# Patient Record
Sex: FEMALE | Race: WHITE | NOT HISPANIC OR LATINO | Employment: STUDENT | URBAN - NONMETROPOLITAN AREA
[De-identification: names, ages, dates, MRNs, and addresses within clinical notes are randomized per-mention and may not be internally consistent; named-entity substitution may affect disease eponyms.]

---
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
## 2023-05-30 DIAGNOSIS — N92.6 MENSTRUAL IRREGULARITY: ICD-10-CM

## 2023-05-30 DIAGNOSIS — Z30.09 ENCOUNTER FOR OTHER GENERAL COUNSELING OR ADVICE ON CONTRACEPTION: ICD-10-CM

## 2023-05-30 RX ORDER — MEDROXYPROGESTERONE ACETATE 150 MG/ML
150 INJECTION, SUSPENSION INTRAMUSCULAR
Qty: 1 ML | Refills: 4 | Status: SHIPPED | OUTPATIENT
Start: 2023-05-30

## 2023-05-30 NOTE — TELEPHONE ENCOUNTER
Rx Refill Note  Requested Prescriptions     Pending Prescriptions Disp Refills    medroxyPROGESTERone (DEPO-PROVERA) 150 MG/ML injection [Pharmacy Med Name: MEDROXYPROGESTERONE ACETATE 150 TUCKER] 1 mL 4     Sig: Inject 1 mL into the appropriate muscle as directed by prescriber Every 3 (Three) Months.      Last office visit with prescribing clinician: 6/6/2022   Last telemedicine visit with prescribing clinician: Visit date not found   Next office visit with prescribing clinician: 6/9/2023                         Would you like a call back once the refill request has been completed: [] Yes [] No    If the office needs to give you a call back, can they leave a voicemail: [] Yes [] No    Yaneth Aranda LPN  05/30/23, 11:01 CDT

## 2023-05-31 NOTE — TELEPHONE ENCOUNTER
Pt has not been seen since last June.  Will have to have an office visit before any more is sent in.  I sent 1 month to get her in    31-May-2023 16:01

## 2023-06-07 ENCOUNTER — OFFICE VISIT (OUTPATIENT)
Dept: FAMILY MEDICINE CLINIC | Facility: CLINIC | Age: 18
End: 2023-06-07
Payer: COMMERCIAL

## 2023-06-07 VITALS
DIASTOLIC BLOOD PRESSURE: 89 MMHG | BODY MASS INDEX: 21.03 KG/M2 | TEMPERATURE: 98.6 F | RESPIRATION RATE: 18 BRPM | OXYGEN SATURATION: 99 % | SYSTOLIC BLOOD PRESSURE: 146 MMHG | HEART RATE: 82 BPM | WEIGHT: 142 LBS | HEIGHT: 69 IN

## 2023-06-07 DIAGNOSIS — Z30.011 OCP (ORAL CONTRACEPTIVE PILLS) INITIATION: Primary | ICD-10-CM

## 2023-06-07 PROCEDURE — 99214 OFFICE O/P EST MOD 30 MIN: CPT | Performed by: NURSE PRACTITIONER

## 2023-06-07 RX ORDER — SPIRONOLACTONE 50 MG/1
50 TABLET, FILM COATED ORAL 2 TIMES DAILY
COMMUNITY
Start: 2023-03-08

## 2023-06-07 RX ORDER — DROSPIRENONE AND ETHINYL ESTRADIOL 0.02-3(28)
1 KIT ORAL DAILY
Qty: 28 TABLET | Refills: 11 | Status: SHIPPED | OUTPATIENT
Start: 2023-06-07

## 2023-06-07 NOTE — PROGRESS NOTES
"Chief Complaint  Contraception (Pt wanted to switch BC due to side effects of depo)    Subjective        Jared Bishop presents to [facility]  History of Present Illness  Jared Bishop is a 17-year-old female presenting to discuss oral contraceptives.     The patient reports she did not have menstrual cycles when she was on Depo Provera injections. She is sexually active and has not had a Pap smear. She is due for a physical but not eligible to have it done until after 07/13/2023. She was supposed to get her next Depo Provera injection yesterday, 06/06/2023 but did not and is interested in starting oral contraceptives.     She reports constipation that has been causing her to take MiraLAX daily.     She is in college at [employer] ([employer]) and will start her second semester in the Fall of 2023.     Objective   Vital Signs:  /89 (BP Location: Left arm, Patient Position: Sitting, Cuff Size: Adult)   Pulse 82   Temp 98.6 °F (37 °C) (Infrared)   Resp 18   Ht 175.3 cm (69\")   Wt 64.4 kg (142 lb)   SpO2 99%   BMI 20.97 kg/m²   Estimated body mass index is 20.97 kg/m² as calculated from the following:    Height as of this encounter: 175.3 cm (69\").    Weight as of this encounter: 64.4 kg (142 lb).  41 %ile (Z= -0.22) based on CDC (Girls, 2-20 Years) BMI-for-age based on BMI available as of 6/7/2023.    BMI is within normal parameters. No other follow-up for BMI required.    Physical Exam  Vitals and nursing note reviewed.   Constitutional:       General: She is awake.      Appearance: Normal appearance. She is well-developed and well-groomed.   HENT:      Head: Normocephalic and atraumatic.      Right Ear: Hearing, tympanic membrane, ear canal and external ear normal.      Left Ear: Hearing, tympanic membrane, ear canal and external ear normal.      Nose: Nose normal.      Mouth/Throat:      Lips: Pink.      Pharynx: Oropharynx is clear. "   Eyes:      General: Lids are normal.      Conjunctiva/sclera: Conjunctivae normal.   Cardiovascular:      Rate and Rhythm: Normal rate and regular rhythm.      Heart sounds: Normal heart sounds.   Pulmonary:      Effort: Pulmonary effort is normal.      Breath sounds: Normal breath sounds and air entry.   Musculoskeletal:      Cervical back: Full passive range of motion without pain.      Right lower leg: No edema.      Left lower leg: No edema.   Lymphadenopathy:      Head:      Right side of head: No submental, submandibular or tonsillar adenopathy.      Left side of head: No submental, submandibular or tonsillar adenopathy.   Skin:     General: Skin is warm and dry.   Neurological:      Mental Status: She is alert.      Sensory: Sensation is intact.      Motor: Motor function is intact.      Coordination: Coordination is intact.      Gait: Gait is intact.   Psychiatric:         Attention and Perception: Attention and perception normal.         Mood and Affect: Mood and affect normal.         Speech: Speech normal.         Behavior: Behavior normal. Behavior is cooperative.         Thought Content: Thought content normal.         Cognition and Memory: Cognition and memory normal.         Judgment: Judgment normal.      Result Review :                   Assessment and Plan   Diagnoses and all orders for this visit:    1. OCP (oral contraceptive pills) initiation (Primary)  -     drospirenone-ethinyl estradiol (JAIME) 3-0.02 MG per tablet; Take 1 tablet by mouth Daily.  Dispense: 28 tablet; Refill: 11    Urine poc pregnancy ordered today     We will start her on the Jaime oral contraceptive. Recommended that she set a reminder on her phone to assist with remembering to take it. If she skips a tablet one night, she should take it as soon as she remembers or the next day. I explained that there is a possibility of breakthrough bleeding with skipped doses. Advised that she may have a menstrual period since she is  switching from one birth control to another. It is fine for her to go ahead and start taking the Stephie today, or she can pick a day that is convenient for her.       POC Pregnancy, Urine [POC7] (Order [id number])  Order  Date: 6/6/2022 Department: [facility] Ordering/Authorizing: Mecca Overton, DNP, APRN     Reprint Order Requisition    POC Pregnancy, Urine (Order #[id number]) on 6/6/22        POC Pregnancy, Urine  Order: [id number]  Status: Final result       Visible to patient: Yes (seen)       Next appt: None       Dx: Menstrual irregularity; Encounter for...    Specimen Information: Urine   0 Result Notes           Component  Ref Range & Units 1 yr ago  (6/6/22) 2 yr ago  (5/3/21) 4 yr ago  (2/22/19) 4 yr ago  (2/22/19) 4 yr ago  (8/28/18) 5 yr ago  (2/6/18)   HCG, Urine, QL  Negative Negative Negative   Negative    Lot Number [id number]   Internal Positive Control  Positive, Passed Positive Positive R   Positive R    Internal Negative Control  Negative, Passed Negative Negative R   Negative R    Expiration Date 07/31/2023  9,212,021 5,312,020  12/31/19   Resulting Agency [facility]              Specimen Collected: 06/06/22 12:27 CDT Last Resulted: 06/06/22 12:27 CDT             Health Maintenance Summary      Overdue - ANNUAL PHYSICAL: (Yearly): Overdue since 7/13/2022 07/13/2021  Registry Metric: Last Annual Physical      Postponed - HPV VACCINES: (1 - 2-dose series): Postponed until 6/7/2023 06/07/2023  Postponed until 6/7/2023 by Milana Parmar MA (Patient Refused)    06/06/2022  Postponed until 6/6/2022 by Yaneth Aranda LPN (Pending event)      Postponed - COVID-19 Vaccine: (3 - Pfizer series): Postponed until  6/9/2023 06/07/2023  Postponed until 6/9/2023 by Milana Parmar MA (Patient Refused)    06/06/2022  Postponed until 6/25/2022 by Yaneth Aranda LPN (Pending event)    05/16/2021  Imm Admin: COVID-19 (PFIZER) PURPLE CAP    04/12/2021  Imm Admin: COVID-19 (PFIZER) PURPLE CAP      Postponed - HEPATITIS C SCREENING: (Once): Postponed until 7/13/2023 06/07/2023  Postponed until 7/13/2023 by Mecca Overton DNP, APRN (Pending event)    06/06/2022  Postponed until 6/6/2022 by Yaneth Aranda LPN (Pending event)      Postponed - CHLAMYDIA SCREENING: (Yearly): Postponed until 11/27/2023 06/07/2023  Postponed until 11/27/2023 by Mecca Overton DNP, MADINA (Pending event)    06/06/2022  Postponed until 6/6/2022 by Yaneth Aranda LPN (Pending event)      INFLUENZA VACCINE: (Yearly - August to March): Next due on 8/1/2023 11/04/2022  Imm Admin: FluLaval/Fluzone >6mos    02/06/2018  Declined      TDAP/TD VACCINES: (2 - Td or Tdap): Next due on 5/20/2025 05/20/2015  Imm Admin: Tdap      MENINGOCOCCAL VACCINE: (Series Information): Completed  01/27/2021  Imm Admin: Meningococcal Conjugate    05/20/2015  Imm Admin: Meningococcal Conjugate    05/20/2015  Imm Admin: Meningococcal MCV4P (Menactra)    05/20/2015  Imm Admin: MCV4 Unspecified      Pneumococcal Vaccine 0-64: (Series Information): Aged Out  No completion, postpone, frequency change, or communication history exists for this topic.          Follow Up   Return for Annual physical  due in july .  Patient was given instructions and counseling regarding her condition or for health maintenance advice. Please see specific information pulled into the AVS if appropriate.       Answers submitted by the patient for this visit:  Other (Submitted on 6/2/2023)  Please describe your symptoms.: N/A  Have you had these symptoms before?: No  How long have you been having these symptoms?: Greater than 2 weeks  Primary Reason for Visit (Submitted on 6/2/2023)  What is the primary  reason for your visit?: Other    Transcribed from ambient dictation for Mecca Overton, JERAD, APRN by Talita Sullivan.  06/07/23   15:44 CDT    Patient or patient representative verbalized consent to the visit recording.  I have personally performed the services described in this document as transcribed by the above individual, and it is both accurate and complete.

## 2023-08-11 ENCOUNTER — OFFICE VISIT (OUTPATIENT)
Dept: FAMILY MEDICINE CLINIC | Facility: CLINIC | Age: 18
End: 2023-08-11
Payer: COMMERCIAL

## 2023-08-11 VITALS
DIASTOLIC BLOOD PRESSURE: 78 MMHG | OXYGEN SATURATION: 99 % | SYSTOLIC BLOOD PRESSURE: 120 MMHG | HEIGHT: 69 IN | RESPIRATION RATE: 18 BRPM | BODY MASS INDEX: 21.18 KG/M2 | TEMPERATURE: 98.2 F | HEART RATE: 102 BPM | WEIGHT: 143 LBS

## 2023-08-11 DIAGNOSIS — Z00.00 ENCOUNTER FOR WELL ADULT EXAM WITHOUT ABNORMAL FINDINGS: Primary | ICD-10-CM

## 2023-08-11 PROCEDURE — 99395 PREV VISIT EST AGE 18-39: CPT | Performed by: NURSE PRACTITIONER

## 2023-08-11 NOTE — PROGRESS NOTES
CC: ANNUAL WELL WOMAN EXAM, without GYN    HPI  Jared Bishop is a 17 y.o. No obstetric history on file.     The patient is here today for an annual physical examination.    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     The patient started having a menstrual cycle when she was 10 years old. She does have a regular menstrual cycle every 28 days that will last about 4 days. Her menstrual period is both heavy and light. She is sexually active. She denies having any issues with STDs, vaginal itching, burning, irritation, or discharge. She is taking the Stephie contraceptive pill without having any side effects. She is a non-smoker. She denies having had a Pap smear done in the past. She denies having any fever, chills, nausea, vomiting, diarrhea, or anything out of the ordinary. She does not have an advance care directive or a living will. She denies having had any pregnancies, miscarriages, or abortions. She takes ibuprofen as needed. Her dermatologist prescribed Aldactone for her acne on the face. She is not old enough to have a DEXA scan or mammogram done. She received 2 doses of the meningococcal conjugate vaccinations. She declines a hepatitis C screening. She is unsure if she received an HPV vaccine in her childhood years. She received an influenza vaccination in 2022, and she has not had 1 this year yet. She goes for a dental examination every 6 months. She cannot remember the last time she had an eye examination. Her BMI today is 21.12 kg/m2. She declined to do laboratory work today.     The following portions of the patient's history were reviewed and updated as appropriate: allergies, current medications, past family history, past medical history, past social history, past surgical history and problem list.    ROS  Review of Systems   Constitutional: Negative.    HENT: Negative.     Respiratory: Negative.     Cardiovascular:  Negative.    Gastrointestinal: Negative.    Genitourinary: Negative.    Musculoskeletal: Negative.    Hematological: Negative.    All other systems reviewed and are negative.    PAST MEDICAL HISTORY  History reviewed. No pertinent past medical history.    PAST SURGICAL HISTORY  Past Surgical History:   Procedure Laterality Date    EXTERNAL EAR SURGERY      cysit removal       FAMILY HISTORY  Family History   Problem Relation Age of Onset    Multiple sclerosis Mother     No Known Problems Father     Diabetes Maternal Grandmother     Kidney disease Maternal Grandfather     Hypertension Maternal Grandfather     Heart disease Maternal Grandfather     No Known Problems Paternal Grandmother     Hypertension Paternal Grandfather        SOCIAL HISTORY  Social History     Socioeconomic History    Marital status: Single   Tobacco Use    Smoking status: Never    Smokeless tobacco: Never   Substance and Sexual Activity    Alcohol use: No    Drug use: No    Sexual activity: Yes     Partners: Male     Birth control/protection: Condom, Depo-provera     ALLERGIES  No Known Allergies    HEALTH MAINTENANCE    Health Maintenance Summary    Postponed - HPV VACCINES: (1 - 2-dose series): Postponed until 8/11/2023 08/11/2023  Postponed until 8/11/2023 by Mecca Overton, JERAD, APRN (Patient Refused)    06/07/2023  Postponed until 6/7/2023 by Milana Parmar MA (Patient Refused)    06/06/2022  Postponed until 6/6/2022 by Yaneth Aranda LPN (Pending event)      Postponed - COVID-19 Vaccine: (3 - Pfizer series): Postponed until 8/13/2023 08/11/2023  Postponed until 8/13/2023 by Milana Parmar CMA (Patient Refused)    06/07/2023  Postponed until 6/9/2023 by Milana Parmar MA (Patient Refused)    06/06/2022  Postponed until 6/25/2022 by Yaneth Aranda LPN (Pending event)    05/16/2021  Imm Admin: COVID-19 (PFIZER) PURPLE CAP    04/12/2021  Imm Admin: COVID-19 (PFIZER) PURPLE CAP      Postponed - CHLAMYDIA SCREENING: (Yearly): Postponed  "until 11/27/2023 06/07/2023  Postponed until 11/27/2023 by Mecca Overton DNP, APRN (Pending event)    06/06/2022  Postponed until 6/6/2022 by Yaneth Aranda LPN (Pending event)      Postponed - HEPATITIS C SCREENING: (Once): Postponed until 8/11/2024 08/11/2023  Postponed until 8/11/2024 by Mecca Overton DNP, APRN (Patient Refused)    06/07/2023  Postponed until 7/13/2023 by Mecca Overton DNP, APRN (Pending event)    06/06/2022  Postponed until 6/6/2022 by Yaneth Aranda LPN (Pending event)      INFLUENZA VACCINE: (Yearly - October to March): Next due on 10/1/2023  11/04/2022  Imm Admin: FluLaval/Fluzone >6mos    02/06/2018  Declined      ANNUAL PHYSICAL: (Yearly): Next due on 8/11/2024 08/11/2023  Done    07/13/2021  Registry Metric: Last Annual Physical      TDAP/TD VACCINES: (2 - Td or Tdap): Next due on 5/20/2025 05/20/2015  Imm Admin: Tdap      MENINGOCOCCAL VACCINE: (Series Information): Completed  01/27/2021  Imm Admin: Meningococcal Conjugate    05/20/2015  Imm Admin: Meningococcal Conjugate    05/20/2015  Imm Admin: Meningococcal MCV4P (Menactra)    05/20/2015  Imm Admin: MCV4 Unspecified      Pneumococcal Vaccine 0-64: (Series Information): Aged Out  No completion, postpone, frequency change, or communication history exists for this topic.       Flu vaccine: due in September   Dental check: 6 months ago   Vision exam: not up to date      PE  /78 (BP Location: Right arm, Patient Position: Sitting, Cuff Size: Large Adult)   Pulse 102   Temp 98.2 øF (36.8 øC) (Infrared)   Resp 18   Ht 175.3 cm (69\") Comment: per patient  Wt 64.9 kg (143 lb)   SpO2 99%   BMI 21.12 kg/mý          Physical Exam  Vitals and nursing note reviewed.   Constitutional:       General: She is awake.      Appearance: Normal appearance. She is well-developed and well-groomed.   HENT:      Head: Normocephalic and atraumatic.      Right Ear: Hearing, tympanic membrane, ear canal and external ear normal.    "   Left Ear: Hearing, tympanic membrane, ear canal and external ear normal.      Nose: Nose normal.      Mouth/Throat:      Lips: Pink.      Pharynx: Oropharynx is clear.   Eyes:      General: Lids are normal.      Conjunctiva/sclera: Conjunctivae normal.   Cardiovascular:      Rate and Rhythm: Normal rate and regular rhythm.      Heart sounds: Normal heart sounds.   Pulmonary:      Effort: Pulmonary effort is normal.      Breath sounds: Normal breath sounds and air entry.   Musculoskeletal:      Cervical back: Full passive range of motion without pain.      Right lower leg: No edema.      Left lower leg: No edema.   Lymphadenopathy:      Head:      Right side of head: No submental, submandibular or tonsillar adenopathy.      Left side of head: No submental, submandibular or tonsillar adenopathy.   Skin:     General: Skin is warm and dry.   Neurological:      Mental Status: She is alert.      Sensory: Sensation is intact.      Motor: Motor function is intact.      Coordination: Coordination is intact.      Gait: Gait is intact.   Psychiatric:         Attention and Perception: Attention and perception normal.         Mood and Affect: Mood and affect normal.         Speech: Speech normal.         Behavior: Behavior normal. Behavior is cooperative.         Thought Content: Thought content normal.         Cognition and Memory: Cognition and memory normal.         Judgment: Judgment normal.         Diagnoses and all orders for this visit:    1. Encounter for well adult exam without abnormal findings (Primary)    Patient Counseling:  --Nutrition: Stressed importance of moderation in sodium/caffeine intake, saturated fat and cholesterol, caloric balance, sufficient intake of fresh fruits, vegetables, fiber, calcium, iron,   --Exercise: Stressed the importance of regular exercise.   --Sexuality: Briefly discussed.  Not sexually active. Patient safety discussed.   --Injury prevention: Discussed safety belts, safety helmets,  smoke detector, smoking near bedding or upholstery.   --Dental health: Discussed importance of regular tooth brushing, flossing, and dental visits.  --Immunizations reviewed.  --After hours service discussed with patient    HEALTH PROMOTION/ PREVENTION  I discussed and encouraged age appropriate health promotion/ prevention screenings and immunizations specific to this client: pneumococcal vaccine, zoster vaccine, Tdap, annual wellness, lipid panel, hepatitis C screening, patient declines these recommendations at this time.    Return in about 1 year (around 8/11/2024) for Annual physical.    Transcribed from ambient dictation for Mecca Overton DNP, APRN by Bryce Keller.  08/11/23   12:29 CDT    Patient or patient representative verbalized consent to the visit recording.  I have personally performed the services described in this document as transcribed by the above individual, and it is both accurate and complete.      Electronically signed by Mecca Overton DNP, APRN, 08/13/23, 6:34 PM CDT.

## 2024-02-23 ENCOUNTER — OFFICE VISIT (OUTPATIENT)
Dept: FAMILY MEDICINE CLINIC | Facility: CLINIC | Age: 19
End: 2024-02-23
Payer: COMMERCIAL

## 2024-02-23 VITALS
BODY MASS INDEX: 21.92 KG/M2 | SYSTOLIC BLOOD PRESSURE: 143 MMHG | RESPIRATION RATE: 16 BRPM | HEIGHT: 69 IN | TEMPERATURE: 98.7 F | HEART RATE: 89 BPM | OXYGEN SATURATION: 98 % | WEIGHT: 148 LBS | DIASTOLIC BLOOD PRESSURE: 82 MMHG

## 2024-02-23 DIAGNOSIS — J02.9 ACUTE PHARYNGITIS, UNSPECIFIED ETIOLOGY: Primary | ICD-10-CM

## 2024-02-23 LAB
EXPIRATION DATE: NORMAL
INTERNAL CONTROL: NORMAL
Lab: NORMAL
S PYO AG THROAT QL: NEGATIVE

## 2024-02-23 PROCEDURE — 99213 OFFICE O/P EST LOW 20 MIN: CPT | Performed by: FAMILY MEDICINE

## 2024-02-23 PROCEDURE — 87880 STREP A ASSAY W/OPTIC: CPT | Performed by: FAMILY MEDICINE

## 2024-02-23 RX ORDER — AMOXICILLIN 500 MG/1
500 CAPSULE ORAL 2 TIMES DAILY
Qty: 20 CAPSULE | Refills: 0 | Status: SHIPPED | OUTPATIENT
Start: 2024-02-23 | End: 2024-03-04

## 2024-02-23 NOTE — PROGRESS NOTES
"Chief Complaint  Sore Throat (Patient reports that she has a sore throat. )    Subjective        Jared Bishop presents to [facility]  History of Present Illness  Mrs. Bishop presents today for a sick visit.  She has a sore throat.  This has been going on for 6 days.  No known sick contacts.  No fever.      Objective   Vital Signs:  /82 (BP Location: Right arm, Patient Position: Sitting, Cuff Size: Adult)   Pulse 89   Temp 98.7 °F (37.1 °C) (Infrared)   Resp 16   Ht 175.3 cm (69\")   Wt 67.1 kg (148 lb)   SpO2 98%   BMI 21.86 kg/m²   Estimated body mass index is 21.86 kg/m² as calculated from the following:    Height as of this encounter: 175.3 cm (69\").    Weight as of this encounter: 67.1 kg (148 lb).  Facility age limit for growth %baldemar is 20 years.    BMI is within normal parameters. No other follow-up for BMI required.      Physical Exam  Vitals reviewed.   Constitutional:       General: She is not in acute distress.     Appearance: She is not toxic-appearing.   HENT:      Head: Normocephalic and atraumatic.      Mouth/Throat:      Mouth: Mucous membranes are moist.      Pharynx: Oropharyngeal exudate present.   Eyes:      Extraocular Movements: Extraocular movements intact.      Conjunctiva/sclera: Conjunctivae normal.      Pupils: Pupils are equal, round, and reactive to light.   Cardiovascular:      Rate and Rhythm: Normal rate and regular rhythm.      Pulses: Normal pulses.      Heart sounds: No murmur heard.  Pulmonary:      Effort: Pulmonary effort is normal. No respiratory distress.      Breath sounds: Normal breath sounds. No wheezing or rhonchi.   Abdominal:      General: Bowel sounds are normal. There is no distension.      Palpations: Abdomen is soft.      Tenderness: There is no abdominal tenderness.   Musculoskeletal:         General: No swelling or tenderness. Normal range of motion.      Cervical back: Normal range of motion and neck supple. No " muscular tenderness.   Skin:     General: Skin is warm and dry.      Findings: No erythema or rash.   Neurological:      General: No focal deficit present.      Mental Status: She is alert and oriented to person, place, and time.      Cranial Nerves: No cranial nerve deficit.      Motor: No weakness.   Psychiatric:         Mood and Affect: Mood normal.         Behavior: Behavior normal.        Result Review :               Assessment and Plan     Diagnoses and all orders for this visit:    1. Acute pharyngitis, unspecified etiology (Primary)  -     POCT rapid strep A  -     amoxicillin (AMOXIL) 500 MG capsule; Take 1 capsule by mouth 2 (Two) Times a Day for 10 days.  Dispense: 20 capsule; Refill: 0    Given prolonged duration and exudate, lean towards this being bacterial.    Will treat with a course of Amoxicillin  Follow up if worsening or failing to improve         Follow Up     No follow-ups on file.  Patient was given instructions and counseling regarding her condition or for health maintenance advice. Please see specific information pulled into the AVS if appropriate.

## 2024-04-29 ENCOUNTER — PATIENT MESSAGE (OUTPATIENT)
Dept: FAMILY MEDICINE CLINIC | Facility: CLINIC | Age: 19
End: 2024-04-29
Payer: COMMERCIAL

## 2024-04-29 DIAGNOSIS — T75.3XXA MOTION SICKNESS, INITIAL ENCOUNTER: Primary | ICD-10-CM

## 2024-04-29 RX ORDER — SCOLOPAMINE TRANSDERMAL SYSTEM 1 MG/1
1 PATCH, EXTENDED RELEASE TRANSDERMAL
Qty: 2 EACH | Refills: 0 | Status: SHIPPED | OUTPATIENT
Start: 2024-04-29

## 2024-04-29 NOTE — TELEPHONE ENCOUNTER
From: Jared Bishop  To: Mecca Overton  Sent: 4/29/2024 1:30 PM CDT  Subject: Motion Sickness patch     Good afternoon!     I’m just reaching out to see if I could get a prescription for some motion sickness patches. I really struggle with getting motion sick in the car, on planes, on rollercoasters, etc. I have tried Dramamine and it did not work. I’m going on a trip to [place] next month and I’m afraid I won’t be able to ride any rides if I don’t have something to counter the motion sickness.     Thank you!   Jared Bishop

## 2024-06-05 DIAGNOSIS — Z30.011 OCP (ORAL CONTRACEPTIVE PILLS) INITIATION: ICD-10-CM

## 2024-06-05 RX ORDER — DROSPIRENONE AND ETHINYL ESTRADIOL 0.02-3(28)
1 KIT ORAL DAILY
Qty: 28 TABLET | Refills: 0 | Status: SHIPPED | OUTPATIENT
Start: 2024-06-05

## 2024-06-05 NOTE — TELEPHONE ENCOUNTER
Rx Refill Note  Requested Prescriptions     Pending Prescriptions Disp Refills    drospirenone-ethinyl estradiol (JAIME) 3-0.02 MG per tablet 28 tablet 11     Sig: Take 1 tablet by mouth Daily.      Last office visit with prescribing clinician: 8/11/2023   Next office visit with prescribing clinician: 8/13/2024       Milana Parmar CMA  06/05/24, 11:22 CDT

## 2024-06-07 ENCOUNTER — TELEPHONE (OUTPATIENT)
Dept: FAMILY MEDICINE CLINIC | Facility: CLINIC | Age: 19
End: 2024-06-07
Payer: COMMERCIAL

## 2024-06-07 NOTE — TELEPHONE ENCOUNTER
Caller: Jared Bishop    Relationship to patient: Self    Best call back number: [phone]     Patient is needing: MORE REFILLS ADDED TO HER BIRTH CONTROL     drospirenone-ethinyl estradiol (JAIME) 3-0.02 MG per tablet    LAST SCRIPT HAD NO REFILLS ON IT

## 2024-06-13 ENCOUNTER — PATIENT MESSAGE (OUTPATIENT)
Dept: FAMILY MEDICINE CLINIC | Facility: CLINIC | Age: 19
End: 2024-06-13
Payer: COMMERCIAL

## 2024-06-13 NOTE — TELEPHONE ENCOUNTER
From: Milana GALLARDO  To: Jared Bishop  Sent: 6/13/2024 8:11 AM CDT  Subject: Appointment    Mecca Coleman has sent a month supply of your birth control into the pharmacy, however you are due for a follow up visit. Please call the office to schedule a visit to continue with your refills. We'd be happy to help get your scheduled.    Have a great day!    ANTONIO Cortés

## 2024-07-01 DIAGNOSIS — Z30.011 OCP (ORAL CONTRACEPTIVE PILLS) INITIATION: ICD-10-CM

## 2024-07-01 RX ORDER — DROSPIRENONE AND ETHINYL ESTRADIOL 0.02-3(28)
1 KIT ORAL DAILY
Qty: 28 TABLET | Refills: 0 | OUTPATIENT
Start: 2024-07-01

## 2024-07-01 RX ORDER — DROSPIRENONE AND ETHINYL ESTRADIOL 0.02-3(28)
1 KIT ORAL DAILY
Qty: 28 TABLET | Refills: 0 | Status: SHIPPED | OUTPATIENT
Start: 2024-07-01

## 2024-07-01 NOTE — TELEPHONE ENCOUNTER
Caller: Jared Bishop    Relationship: Self    Best call back number: [phone]     Requested Prescriptions:   Requested Prescriptions     Pending Prescriptions Disp Refills    drospirenone-ethinyl estradiol (JAIME) 3-0.02 MG per tablet 28 tablet 0     Sig: Take 1 tablet by mouth Daily.        Pharmacy where request should be sent: [facility]  [id number] - [phone]  - [phone]     Last office visit with prescribing clinician: 8/11/2023   Last telemedicine visit with prescribing clinician: Visit date not found   Next office visit with prescribing clinician: 7/24/2024     Additional details provided by patient: ONE WEEK LEFT    Does the patient have less than a 3 day supply:  [] Yes  [x] No    Would you like a call back once the refill request has been completed: [] Yes [] No    If the office needs to give you a call back, can they leave a voicemail: [] Yes [] No    Donal Goode   07/01/24 12:27 CDT

## 2024-07-24 ENCOUNTER — OFFICE VISIT (OUTPATIENT)
Dept: FAMILY MEDICINE CLINIC | Facility: CLINIC | Age: 19
End: 2024-07-24
Payer: COMMERCIAL

## 2024-07-24 VITALS
OXYGEN SATURATION: 99 % | SYSTOLIC BLOOD PRESSURE: 130 MMHG | TEMPERATURE: 98.6 F | WEIGHT: 146 LBS | RESPIRATION RATE: 18 BRPM | HEART RATE: 101 BPM | DIASTOLIC BLOOD PRESSURE: 82 MMHG | BODY MASS INDEX: 21.62 KG/M2 | HEIGHT: 69 IN

## 2024-07-24 DIAGNOSIS — Z30.09 BIRTH CONTROL COUNSELING: ICD-10-CM

## 2024-07-24 DIAGNOSIS — Z00.01 ENCOUNTER FOR WELL ADULT EXAM WITH ABNORMAL FINDINGS: Primary | ICD-10-CM

## 2024-07-24 DIAGNOSIS — R53.83 OTHER FATIGUE: ICD-10-CM

## 2024-07-24 PROCEDURE — 99395 PREV VISIT EST AGE 18-39: CPT | Performed by: NURSE PRACTITIONER

## 2024-07-24 RX ORDER — DROSPIRENONE AND ETHINYL ESTRADIOL 0.03MG-3MG
1 KIT ORAL DAILY
Qty: 90 TABLET | Refills: 1 | Status: SHIPPED | OUTPATIENT
Start: 2024-07-24

## 2024-07-24 RX ORDER — HYDROXYZINE HYDROCHLORIDE 25 MG/1
25 TABLET, FILM COATED ORAL 3 TIMES DAILY PRN
Qty: 90 TABLET | Refills: 1 | Status: SHIPPED | OUTPATIENT
Start: 2024-07-24

## 2024-07-24 RX ORDER — DROSPIRENONE AND ETHINYL ESTRADIOL 0.03MG-3MG
1 KIT ORAL DAILY
COMMUNITY
Start: 2024-05-07 | End: 2024-07-24 | Stop reason: SDUPTHER

## 2024-07-24 NOTE — PROGRESS NOTES
Subjective   CC: adult well       Jared Bishop is a 18 y.o. patient who presents for an annual well exam, blood work and anxiety issues.  Says that she has had problems with anxiety over the last couple years and it is getting worse.   She also states that she needs something for anxiety.  Have been having anxiety that she can't control for several months.  Sometimes staying home alone over night gets her upset.  Denies suicidal/homicidal ideations    The following portions of the patient's history were reviewed and updated as appropriate: allergies, current medications, past family history, past medical history, past social history, past surgical history, and problem list.    Compared to one year ago, the patient's physical health is the same.  Compared to one year ago, the patient's mental health is the same.      Menstrual History  Started menstrual cycle age 11  Regular menses every 30 days  No pregnancies  No abortions  No miscarriages  Denies any STD's  Not sexually active  Has not had a pap smear       Recent Hospitalizations:  She was not admitted to the hospital during the last year.     Current Medical Providers:  Patient Care Team:  Mecca Overton DNP, APRN as PCP - General (Family Medicine)  Mecca Overton DNP, APRARMANI as PCP - Family Medicine    Outpatient Medications Prior to Visit   Medication Sig Dispense Refill    Aldactone 50 MG tablet Take 1 tablet by mouth 2 (Two) Times a Day.      drospirenone-ethinyl estradiol (RONNIE,OCELLA) 3-0.03 MG per tablet Take 1 tablet by mouth Daily.      ibuprofen (ADVIL,MOTRIN) 200 MG tablet Take 1 tablet by mouth Every 6 (Six) Hours As Needed for Mild Pain . 20 tablet 0    Scopolamine 1 MG/3DAYS patch Place 1 patch on the skin as directed by provider Every 72 (Seventy-Two) Hours. 2 each 0    drospirenone-ethinyl estradiol (JAIME) 3-0.02 MG per tablet Take 1 tablet by mouth Daily. 28 tablet 0     No facility-administered medications prior to visit.     No  "opioid medication identified on active medication list. I have reviewed chart for other potential  high risk medication/s and harmful drug interactions in the elderly.      Aspirin is not on active medication list.  Aspirin use is not indicated based on review of current medical condition/s. Risk of harm outweighs potential benefits.  .    There is no problem list on file for this patient.    Advance Care Planning (Click this link to access ACP Navigator)  Advance Directive is not on file.  ACP discussion was held with the patient during this visit. Patient does not have an advance directive, information provided.        Objective   Vitals:    07/24/24 1017   BP: 130/82   BP Location: Left arm   Patient Position: Sitting   Cuff Size: Adult   Pulse: 101   Resp: 18   Temp: 98.6 °F (37 °C)   TempSrc: Infrared   SpO2: 99%   Weight: 66.2 kg (146 lb)   Height: 175.3 cm (69\")  Comment: per patient   PainSc: 0-No pain       Estimated body mass index is 21.56 kg/m² as calculated from the following:    Height as of this encounter: 175.3 cm (69\").    Weight as of this encounter: 66.2 kg (146 lb).    BMI is within normal parameters. No other follow-up for BMI required.        Does the patient have evidence of cognitive impairment? No         Physical Exam  Vitals and nursing note reviewed.   Constitutional:       General: She is awake.      Appearance: Normal appearance. She is well-developed and well-groomed.   HENT:      Head: Normocephalic and atraumatic.      Right Ear: Hearing, tympanic membrane, ear canal and external ear normal.      Left Ear: Hearing, tympanic membrane, ear canal and external ear normal.      Nose: Nose normal.      Mouth/Throat:      Lips: Pink.      Pharynx: Oropharynx is clear.   Eyes:      General: Lids are normal.      Conjunctiva/sclera: Conjunctivae normal.   Cardiovascular:      Rate and Rhythm: Normal rate and regular rhythm.      Heart sounds: Normal heart sounds.   Pulmonary:      Effort: " Pulmonary effort is normal.      Breath sounds: Normal breath sounds and air entry.   Musculoskeletal:      Cervical back: Full passive range of motion without pain.      Right lower leg: No edema.      Left lower leg: No edema.   Lymphadenopathy:      Head:      Right side of head: No submental, submandibular or tonsillar adenopathy.      Left side of head: No submental, submandibular or tonsillar adenopathy.   Skin:     General: Skin is warm and dry.   Neurological:      Mental Status: She is alert and oriented to person, place, and time.      Sensory: Sensation is intact.      Motor: Motor function is intact.      Coordination: Coordination is intact.      Gait: Gait is intact.   Psychiatric:         Attention and Perception: Attention and perception normal.         Mood and Affect: Mood and affect normal.         Speech: Speech normal.         Behavior: Behavior normal. Behavior is cooperative.         Thought Content: Thought content normal.         Cognition and Memory: Cognition and memory normal.         Judgment: Judgment normal.                                                                                          Health  Risk Assessment    Smoking Status:  Social History     Tobacco Use   Smoking Status Never   Smokeless Tobacco Never     Alcohol Consumption:  Social History     Substance and Sexual Activity   Alcohol Use No     Fall Risk Screen:  [facility].    Depression Screenin/23/2024     2:02 PM   PHQ-2/PHQ-9 Depression Screening   Little Interest or Pleasure in Doing Things 0-->not at all   Feeling Down, Depressed or Hopeless 0-->not at all   PHQ-9: Brief Depression Severity Measure Score 0     Health Habits and Functional and Cognitive Screening: no cognitive decline     Age-appropriate Screening Schedule:  Refer to the list below for future screening recommendations based on patient's age, sex and/or medical conditions. Orders for these  recommended tests are listed in the plan section. The patient has been provided with a written plan.    Health Maintenance List  Health Maintenance   Topic Date Due    CHLAMYDIA SCREENING  Never done    HPV VACCINES (1 - 3-dose series) Never done    COVID-19 Vaccine (3 - 2023-24 season) 09/01/2023    HEPATITIS C SCREENING  08/11/2024 (Originally 9/22/2017)    INFLUENZA VACCINE  08/01/2024    ANNUAL PHYSICAL  08/11/2024    TDAP/TD VACCINES (2 - Td or Tdap) 05/20/2025    MENINGOCOCCAL VACCINE  Completed    Pneumococcal Vaccine 0-64  Aged Out       Assessment & Plan  Encounter for well adult exam with abnormal findings    Other fatigue    Birth control counseling        Health Maintenance     Postponed - HPV VACCINES: (1 - 3-dose series): Postponed until 7/24/2024 07/24/2024  Postponed until 7/24/2024 by Mecca Overton DNP, APRN (Patient Refused)   08/11/2023  Postponed until 8/11/2023 by Mecca Overton DNP, APRN (Patient Refused)   06/07/2023  Postponed until 6/7/2023 by Milana Parmar MA (Patient Refused)   06/06/2022  Postponed until 6/6/2022 by Yaneth Aranda LPN (Pending event)     Postponed - HEPATITIS C SCREENING: (Once): Postponed until 8/11/2024 08/11/2023  Postponed until 8/11/2024 by Mecca Overton DNP, APRN (Patient Refused)   06/07/2023  Postponed until 7/13/2023 by Mecca Overton DNP, APRN (Pending event)   06/06/2022  Postponed until 6/6/2022 by Yaneth Aranda LPN (Pending event)     Postponed - CHLAMYDIA SCREENING: (Yearly): Postponed until 8/20/2024 07/24/2024  Postponed until 8/20/2024 by Mecca Overton DNP, APRN (Pending event)   06/07/2023  Postponed until 11/27/2023 by Mecca Overton DNP, APRN (Pending event)   06/06/2022  Postponed until 6/6/2022 by Aranda, Yaneth, LPN (Pending event)     Postponed - COVID-19 Vaccine: (3 - 2023-24 season): Postponed until 9/17/2024 07/24/2024  Postponed until 9/17/2024 by Mecca Overton, DNP, APRN (Pending event)   08/11/2023   Postponed until 8/13/2023 by Milana Parmar CMA (Patient Refused)   06/07/2023  Postponed until 6/9/2023 by Milana Parmar MA (Patient Refused)   06/06/2022  Postponed until 6/25/2022 by Yaneth Aranda LPN (Pending event)   05/16/2021  Imm Admin: COVID-19 (PFIZER) PURPLE CAP        INFLUENZA VACCINE: (Yearly - August to March): Next due on 8/1/2024  10/10/2023  Outside Immunization: Influenza Quad Inj   11/04/2022  Imm Admin: FluLaval/Fluzone >6mos   02/06/2018  Declined     ANNUAL PHYSICAL: (Yearly): Next due on 8/11/2024 08/11/2023  Done     TDAP/TD VACCINES: (2 - Td or Tdap): Next due on 5/20/2025 05/20/2015  Imm Admin: Tdap     MENINGOCOCCAL VACCINE: (Series Information): Completed  01/27/2021  Imm Admin: Meningococcal Conjugate   05/20/2015  Imm Admin: Meningococcal Conjugate   05/20/2015  Imm Admin: Meningococcal MCV4P (Menactra)   05/20/2015  Imm Admin: MCV4 Unspecified     Pneumococcal Vaccine 0-64: (Series Information): Aged Out  No completion, postpone, frequency change, or communication history exists for this topic.       Health Maintenance Summary  : postphoned - HPV VACCINES: (1 - 2-dose series): Postponed until 8/11/2023 08/11/2023  Postponed until 8/11/2023 by Mecca Overton, DNP, APRN (Patient Refused)    06/07/2023  Postponed until 6/7/2023 by Milana Parmar MA (Patient Refused)    06/06/2022  Postponed until 6/6/2022 by Yaneth Aranda LPN (Pending event)      Postponed - COVID-19 Vaccine: (3 - Pfizer series): Postponed until 8/13/2023 08/11/2023  Postponed until 8/13/2023 by Milana Parmar CMA (Patient Refused)    06/07/2023  Postponed until 6/9/2023 by Milana Parmar MA (Patient Refused)    06/06/2022  Postponed until 6/25/2022 by Yaneth Aranda LPN (Pending event)    05/16/2021  Imm Admin: COVID-19 (PFIZER) PURPLE CAP    04/12/2021  Imm Admin: COVID-19 (PFIZER) PURPLE CAP      Postponed - CHLAMYDIA SCREENING: (Yearly): Postponed until 11/27/2023 06/07/2023  Postponed until 11/27/2023 by  Mecca Overton DNP, APRN (Pending event)    06/06/2022  Postponed until 6/6/2022 by Yaneth Aranda LPN (Pending event)      Postponed - HEPATITIS C SCREENING: (Once): Postponed until 8/11/2024 08/11/2023  Postponed until 8/11/2024 by Mecca Overton DNP, APRN (Patient Refused)    06/07/2023  Postponed until 7/13/2023 by Mecca Overton DNP, APRN (Pending event)    06/06/2022  Postponed until 6/6/2022 by Yaneth Aranda LPN (Pending event)      INFLUENZA VACCINE: (Yearly - October to March): Next due on 10/1/2023  11/04/2022  Imm Admin: FluLaval/Fluzone >6mos    02/06/2018  Declined      ANNUAL PHYSICAL: (Yearly): Next due on 8/11/2024 08/11/2023  Done    07/13/2021  Registry Metric: Last Annual Physical      TDAP/TD VACCINES: (2 - Td or Tdap): Next due on 5/20/2025 05/20/2015  Imm Admin: Tdap      MENINGOCOCCAL VACCINE: (Series Information): Completed  01/27/2021  Imm Admin: Meningococcal Conjugate    05/20/2015  Imm Admin: Meningococcal Conjugate    05/20/2015  Imm Admin: Meningococcal MCV4P (Menactra)    05/20/2015  Imm Admin: MCV4 Unspecified      Pneumococcal Vaccine 0-64: (Series Information)          Follow Up:   Return for Annual physical.    Electronically signed by Mecca Overton DNP, APRN, 07/29/24, 7:44 AM CDT.

## 2024-07-25 LAB
ALBUMIN SERPL-MCNC: 4.5 G/DL (ref 3.5–5.2)
ALBUMIN/GLOB SERPL: 1.6 G/DL
ALP SERPL-CCNC: 44 U/L (ref 39–117)
ALT SERPL-CCNC: 14 U/L (ref 1–33)
AST SERPL-CCNC: 16 U/L (ref 1–32)
BILIRUB SERPL-MCNC: 0.4 MG/DL (ref 0–1.2)
BUN SERPL-MCNC: 7 MG/DL (ref 6–20)
BUN/CREAT SERPL: 8.4 (ref 7–25)
CALCIUM SERPL-MCNC: 10 MG/DL (ref 8.6–10.5)
CHLORIDE SERPL-SCNC: 104 MMOL/L (ref 98–107)
CO2 SERPL-SCNC: 21.5 MMOL/L (ref 22–29)
CREAT SERPL-MCNC: 0.83 MG/DL (ref 0.57–1)
EGFRCR SERPLBLD CKD-EPI 2021: 103.6 ML/MIN/1.73
ERYTHROCYTE [DISTWIDTH] IN BLOOD BY AUTOMATED COUNT: 11.4 % (ref 12.3–15.4)
GLOBULIN SER CALC-MCNC: 2.9 GM/DL
GLUCOSE SERPL-MCNC: 78 MG/DL (ref 65–99)
HBA1C MFR BLD: 5 % (ref 4.8–5.6)
HCT VFR BLD AUTO: 45.1 % (ref 34–46.6)
HGB BLD-MCNC: 15.1 G/DL (ref 12–15.9)
MCH RBC QN AUTO: 30.9 PG (ref 26.6–33)
MCHC RBC AUTO-ENTMCNC: 33.5 G/DL (ref 31.5–35.7)
MCV RBC AUTO: 92.2 FL (ref 79–97)
PLATELET # BLD AUTO: 329 10*3/MM3 (ref 140–450)
POTASSIUM SERPL-SCNC: 4.2 MMOL/L (ref 3.5–5.2)
PROT SERPL-MCNC: 7.4 G/DL (ref 6–8.5)
RBC # BLD AUTO: 4.89 10*6/MM3 (ref 3.77–5.28)
SODIUM SERPL-SCNC: 138 MMOL/L (ref 136–145)
T4 FREE SERPL-MCNC: 1.09 NG/DL (ref 0.92–1.68)
TSH SERPL DL<=0.005 MIU/L-ACNC: 3.71 UIU/ML (ref 0.27–4.2)
WBC # BLD AUTO: 6.33 10*3/MM3 (ref 3.4–10.8)

## 2024-10-21 DIAGNOSIS — Z30.09 BIRTH CONTROL COUNSELING: ICD-10-CM

## 2024-10-21 NOTE — TELEPHONE ENCOUNTER
Rx Refill Note  Requested Prescriptions     Pending Prescriptions Disp Refills    drospirenone-ethinyl estradiol (RONNIE,OCELLA) 3-0.03 MG per tablet [Pharmacy Med Name: drospirenone 3 mg-ethinyl estradiol 0.03 mg tablet] 84 tablet 1     Sig: TAKE ONE TABLET BY MOUTH DAILY      Last office visit with prescribing clinician: 7/24/2024   Last telemedicine visit with prescribing clinician: Visit date not found   Next office visit with prescribing clinician: 7/29/2025                         Would you like a call back once the refill request has been completed: [] Yes [] No    If the office needs to give you a call back, can they leave a voicemail: [] Yes [] No    Emilia Guaman MA  10/21/24, 10:39 CDT

## 2024-10-22 RX ORDER — DROSPIRENONE AND ETHINYL ESTRADIOL 0.03MG-3MG
1 KIT ORAL DAILY
Qty: 84 TABLET | Refills: 1 | Status: SHIPPED | OUTPATIENT
Start: 2024-10-22

## 2024-12-25 DIAGNOSIS — Z30.09 BIRTH CONTROL COUNSELING: ICD-10-CM

## 2024-12-31 RX ORDER — DROSPIRENONE AND ETHINYL ESTRADIOL 0.03MG-3MG
1 KIT ORAL DAILY
Qty: 84 TABLET | Refills: 1 | OUTPATIENT
Start: 2024-12-31

## 2025-01-02 ENCOUNTER — OFFICE VISIT (OUTPATIENT)
Dept: FAMILY MEDICINE CLINIC | Facility: CLINIC | Age: 20
End: 2025-01-02
Payer: COMMERCIAL

## 2025-01-02 VITALS
HEIGHT: 69 IN | TEMPERATURE: 98.4 F | OXYGEN SATURATION: 99 % | SYSTOLIC BLOOD PRESSURE: 120 MMHG | BODY MASS INDEX: 22.1 KG/M2 | RESPIRATION RATE: 20 BRPM | WEIGHT: 149.2 LBS | DIASTOLIC BLOOD PRESSURE: 79 MMHG | HEART RATE: 76 BPM

## 2025-01-02 DIAGNOSIS — Z30.41 ORAL CONTRACEPTIVE PILL SURVEILLANCE: Primary | ICD-10-CM

## 2025-01-02 DIAGNOSIS — Z30.09 BIRTH CONTROL COUNSELING: ICD-10-CM

## 2025-01-02 PROCEDURE — 99213 OFFICE O/P EST LOW 20 MIN: CPT | Performed by: NURSE PRACTITIONER

## 2025-01-02 RX ORDER — DROSPIRENONE AND ETHINYL ESTRADIOL 0.03MG-3MG
1 KIT ORAL DAILY
Qty: 84 TABLET | Refills: 1 | Status: SHIPPED | OUTPATIENT
Start: 2025-01-02

## 2025-01-02 NOTE — PROGRESS NOTES
"Chief Complaint  No chief complaint on file.    Subjective        Jared Bishop presents to [facility]  History of Present Illness  The patient is a 19-year-old female who presents today for medication refills and a 6-month follow-up.    She reports no new health concerns since her last visit. She is currently on Aldactone, prescribed by her dermatologist. She has been using scopolamine for travel purposes and does not require additional refills until June 2025. Hydroxyzine, initially prescribed for anxiety, induces sleepiness, so she has discontinued its use but keeps it for emergencies. Ibuprofen is used as needed. She is seeking refills for her birth control medication. She has no personal or family history of clotting disorders. She is a non-smoker.    SOCIAL HISTORY  She does not smoke.    FAMILY HISTORY  No family history of clotting issues.    MEDICATIONS  Aldactone, scopolamine, hydroxyzine, ibuprofen.    Objective   Vital Signs:  /79 (BP Location: Left arm, Patient Position: Sitting, Cuff Size: Adult)   Pulse 76   Temp 98.4 °F (36.9 °C) (Infrared)   Resp 20   Ht 175.3 cm (69\")   Wt 67.7 kg (149 lb 3.2 oz)   SpO2 99%   BMI 22.03 kg/m²   Estimated body mass index is 22.03 kg/m² as calculated from the following:    Height as of this encounter: 175.3 cm (69\").    Weight as of this encounter: 67.7 kg (149 lb 3.2 oz).       BMI is within normal parameters. No other follow-up for BMI required.      Physical Exam  Vitals and nursing note reviewed.   Constitutional:       Appearance: She is well-developed.   HENT:      Head: Normocephalic and atraumatic.   Eyes:      Conjunctiva/sclera: Conjunctivae normal.   Cardiovascular:      Rate and Rhythm: Normal rate and regular rhythm.   Pulmonary:      Effort: Pulmonary effort is normal.   Musculoskeletal:      Cervical back: Normal range of motion.   Skin:     General: Skin is warm and dry.   Neurological:      General: No " focal deficit present.      Mental Status: She is alert and oriented to person, place, and time.   Psychiatric:         Mood and Affect: Mood normal.         Behavior: Behavior normal.        Physical Exam      Result Review :          Results             Assessment and Plan     Diagnoses and all orders for this visit:    1. Oral contraceptive pill surveillance (Primary)  -     Ambulatory Referral to Obstetrics / Gynecology    2. Birth control counseling  -     drospirenone-ethinyl estradiol (RONNIE,OCELLA) 3-0.03 MG per tablet; Take 1 tablet by mouth Daily.  Dispense: 84 tablet; Refill: 1      Assessment & Plan  1. Birth control surveillance/med management   She is currently taking Aldactone, which is managed by her Dermatologist. She does not need a refill for scopolamine until June 2025. She has discontinued hydroxyzine due to its sedative effects but has it available for emergencies. Ibuprofen is used as needed. She needs refills for her birth control medication. A prescription for a 6-month supply of birth control will be provided. A referral to gynecology for a Pap smear has been initiated, and they will take over the prescription thereafter. The gynecology office will contact her via Cintt or phone to schedule the appointment.    Follow-up  The patient will follow up in July 2025.       Follow Up     Return for Next scheduled follow up.  Patient was given instructions and counseling regarding her condition or for health maintenance advice. Please see specific information pulled into the AVS if appropriate.       Patient or patient representative verbalized consent for the use of Ambient Listening during the visit with  MADINA Maki for chart documentation. 1/2/2025  17:13 CST

## 2025-03-04 ENCOUNTER — OFFICE VISIT (OUTPATIENT)
Dept: OBSTETRICS AND GYNECOLOGY | Age: 20
End: 2025-03-04
Payer: COMMERCIAL

## 2025-03-04 VITALS
BODY MASS INDEX: 22.07 KG/M2 | SYSTOLIC BLOOD PRESSURE: 142 MMHG | WEIGHT: 149 LBS | HEIGHT: 69 IN | DIASTOLIC BLOOD PRESSURE: 72 MMHG

## 2025-03-04 DIAGNOSIS — Z30.41 ENCOUNTER FOR SURVEILLANCE OF CONTRACEPTIVE PILLS: ICD-10-CM

## 2025-03-04 DIAGNOSIS — Z01.419 ENCOUNTER FOR GYNECOLOGICAL EXAMINATION: Primary | ICD-10-CM

## 2025-03-04 DIAGNOSIS — Z11.3 SCREEN FOR STD (SEXUALLY TRANSMITTED DISEASE): ICD-10-CM

## 2025-03-04 DIAGNOSIS — L70.9 ACNE, UNSPECIFIED ACNE TYPE: ICD-10-CM

## 2025-03-04 PROCEDURE — 87661 TRICHOMONAS VAGINALIS AMPLIF: CPT | Performed by: NURSE PRACTITIONER

## 2025-03-04 PROCEDURE — 87591 N.GONORRHOEAE DNA AMP PROB: CPT | Performed by: NURSE PRACTITIONER

## 2025-03-04 PROCEDURE — G0123 SCREEN CERV/VAG THIN LAYER: HCPCS | Performed by: NURSE PRACTITIONER

## 2025-03-04 PROCEDURE — 87491 CHLMYD TRACH DNA AMP PROBE: CPT | Performed by: NURSE PRACTITIONER

## 2025-03-04 RX ORDER — CLINDAMYCIN PHOSPHATE AND BENZOYL PEROXIDE 37.5; 1 MG/G; MG/G
1 GEL TOPICAL NIGHTLY
Qty: 50 G | Refills: 1 | Status: SHIPPED | OUTPATIENT
Start: 2025-03-04

## 2025-03-04 RX ORDER — NORETHINDRONE ACETATE AND ETHINYL ESTRADIOL 1MG-20(21)
1 KIT ORAL DAILY
Qty: 84 TABLET | Refills: 1 | Status: SHIPPED | OUTPATIENT
Start: 2025-03-04

## 2025-03-04 NOTE — PROGRESS NOTES
Chief Complaint  Annual Exam (New patient referred to our office by Danish Redding for an annual exam /Pt has no complaints today)  History of Present Illness  The patient is a 19-year-old female who presents for an annual exam.    She reports overall good health, with no issues of constipation, diarrhea, urinary incontinence, or leakage. She does report a consistent vaginal discharge, but it is not associated with itching or odor. The discharge is either white or clear in color.    She has been on birth control for an extended period, initially prescribed to manage her acne, which unfortunately did not improve. She has previously tried the Depo-Provera injection but found it unsatisfactory, prompting her to return to oral contraceptives.   Prior to the Depo-Provera, she was on a different contraceptive, the name of which she can not recall. Her acne remains persistent, with exacerbations coinciding with her menstrual cycle. The acne is predominantly located on her chin, cheeks, and occasionally on her forehead. She expresses a desire to explore alternative treatment options.   Exacerbations coinciding with her menstrual cycle. She has been on birth control for an extended period. She has previously tried the Depo-Provera injection. Prior to the Depo-Provera, she was on a different contraceptive. She has discontinued spironolactone, a medication previously prescribed by her dermatologist, due to lack of efficacy. Topical treatments such as Differin gel and another unspecified topical medication from her dermatologist also proved ineffective, causing skin dryness and increased sun sensitivity. She maintains a regular moisturizing regimen.      Subjective          Jared FAULKNER [place] presents to [facility]  History of Present Illness    Review of Systems   Constitutional:  Negative for activity change, appetite change, fatigue and fever.   HENT:  Negative for congestion, sore throat and trouble  "swallowing.    Eyes:  Negative for pain, discharge and visual disturbance.   Respiratory:  Negative for apnea, shortness of breath and wheezing.    Cardiovascular:  Negative for chest pain, palpitations and leg swelling.   Gastrointestinal:  Negative for abdominal pain, constipation and diarrhea.   Genitourinary:  Negative for frequency, pelvic pain, urgency and vaginal discharge.        OCP   Musculoskeletal:  Negative for back pain and gait problem.   Skin:  Negative for color change and rash.        Acne     Neurological:  Negative for dizziness, weakness and numbness.   Psychiatric/Behavioral:  Negative for confusion and sleep disturbance.         Objective   Vital Signs:   /72   Ht 175.3 cm (69\")   Wt 67.6 kg (149 lb)   BMI 22.00 kg/m²     Physical Exam  Vitals and nursing note reviewed. Exam conducted with a chaperone present.   Constitutional:       General: She is not in acute distress.     Appearance: She is well-developed. She is not diaphoretic.   HENT:      Head: Normocephalic.      Right Ear: External ear normal.      Left Ear: External ear normal.      Nose: Nose normal.   Eyes:      General: No scleral icterus.        Right eye: No discharge.         Left eye: No discharge.      Conjunctiva/sclera: Conjunctivae normal.      Pupils: Pupils are equal, round, and reactive to light.   Neck:      Thyroid: No thyromegaly.      Vascular: No carotid bruit.      Trachea: No tracheal deviation.   Cardiovascular:      Rate and Rhythm: Normal rate and regular rhythm.      Heart sounds: Normal heart sounds. No murmur heard.  Pulmonary:      Effort: Pulmonary effort is normal. No respiratory distress.      Breath sounds: Normal breath sounds. No wheezing.   Chest:   Breasts:     Breasts are symmetrical.      Right: Normal. No swelling, bleeding, inverted nipple, mass, nipple discharge, skin change or tenderness.      Left: Normal. No swelling, bleeding, inverted nipple, mass, nipple discharge, skin change " or tenderness.   Abdominal:      General: There is no distension.      Palpations: Abdomen is soft. There is no mass.      Tenderness: There is no abdominal tenderness. There is no right CVA tenderness, left CVA tenderness or guarding.      Hernia: No hernia is present. There is no hernia in the left inguinal area or right inguinal area.   Genitourinary:     General: Normal vulva.      Exam position: Lithotomy position.      Labia:         Right: No rash, tenderness, lesion or injury.         Left: No rash, tenderness, lesion or injury.       Vagina: Normal. No signs of injury and foreign body. No vaginal discharge, erythema, tenderness or bleeding.      Cervix: Normal.      Uterus: Normal. Not enlarged, not fixed and not tender.       Adnexa: Right adnexa normal and left adnexa normal.        Right: No mass, tenderness or fullness.          Left: No mass, tenderness or fullness.        Rectum: Normal. No mass.      Comments:   BSU normal  Urethral meatus  Normal  Perineum  Normal  Musculoskeletal:         General: No tenderness. Normal range of motion.      Cervical back: Normal range of motion and neck supple.   Lymphadenopathy:      Head:      Right side of head: No submental, submandibular, tonsillar, preauricular, posterior auricular or occipital adenopathy.      Left side of head: No submental, submandibular, tonsillar, preauricular, posterior auricular or occipital adenopathy.      Cervical: No cervical adenopathy.      Right cervical: No superficial, deep or posterior cervical adenopathy.     Left cervical: No superficial, deep or posterior cervical adenopathy.      Upper Body:      Right upper body: No supraclavicular, axillary or pectoral adenopathy.      Left upper body: No supraclavicular, axillary or pectoral adenopathy.      Lower Body: No right inguinal adenopathy. No left inguinal adenopathy.   Skin:     General: Skin is warm and dry.      Findings: No bruising, erythema or rash.   Neurological:       Mental Status: She is alert and oriented to person, place, and time.      Coordination: Coordination normal.   Psychiatric:         Mood and Affect: Mood normal.         Behavior: Behavior normal.         Thought Content: Thought content normal.         Judgment: Judgment normal.       Result Review :                Current Outpatient Medications on File Prior to Visit   Medication Sig    hydrOXYzine (ATARAX) 25 MG tablet Take 1 tablet by mouth 3 (Three) Times a Day As Needed for Itching.    ibuprofen (ADVIL,MOTRIN) 200 MG tablet Take 1 tablet by mouth Every 6 (Six) Hours As Needed for Mild Pain .    Aldactone 50 MG tablet Take 1 tablet by mouth 2 (Two) Times a Day. (Patient not taking: Reported on 3/4/2025)     No current facility-administered medications on file prior to visit.          Assessment and Plan      Well woman GYN exam.   Pap smear done per ASCCP guidelines.   Pelvic exam with chaperone present.     Will have lab work at PCP.     Discussed STD prevention and testing.   Chlamydia/Gonorrhea/Trichomonas ordered.   RPR, Hep panel and HIV ordered.       Will change OCP.     Assessment & Plan  1. Contraception.   Discussed options to help with acne. Will change OCP. Instructed pt about beginning OCP, side effects and S&S to report. Pt voiced understanding.    2. Acne.  She has been advised to use the topical medication once daily or every other day if she experiences sensitivity. She has been cautioned about potential staining of clothing due to the benzoyl peroxide content in the medication. Regular use of sunblock has been recommended. Onexton sent to pharmacy.     Follow-up  The patient is scheduled for a follow-up visit in 3 months.    Diagnoses and all orders for this visit:    1. Encounter for gynecological examination (Primary)  -     Liquid-based Pap Smear, Screening  -     Trichomonas vaginalis, PCR - ThinPrep Vial, Cervix  -     Chlamydia trachomatis, Neisseria gonorrhoeae, PCR - ThinPrep Vial,  Cervix    2. Screen for STD (sexually transmitted disease)  -     Hepatitis Panel, Acute  -     HIV-1 / O / 2 Ag / Antibody  -     RPR Qualitative with Reflex to Quant    3. Encounter for surveillance of contraceptive pills    4. Acne, unspecified acne type    Other orders  -     norethindrone-ethinyl estradiol FE (Microgestin FE 1/20) 1-20 MG-MCG per tablet; Take 1 tablet by mouth Daily.  Dispense: 84 tablet; Refill: 1  -     Clindamycin Phos-Benzoyl Perox 1.2-3.75 % gel; Apply 1 Application topically Every Night.  Dispense: 50 g; Refill: 1  -     Hepatitis C Virus Interpretation          BMI is within normal parameters. No other follow-up for BMI required.       Follow Up   Return in 3 months (on 6/4/2025), or if symptoms worsen or fail to improve.    Patient was given instructions and counseling regarding her condition or for health maintenance advice. Please see specific information pulled into the AVS if appropriate.     Patient or patient representative verbalized consent for the use of Ambient Listening during the visit with  MADINA Stevens for chart documentation. 3/7/2025  21:24 CST

## 2025-03-04 NOTE — PATIENT INSTRUCTIONS

## 2025-03-05 LAB
C TRACH RRNA CVX QL NAA+PROBE: NOT DETECTED
GEN CATEG CVX/VAG CYTO-IMP: NORMAL
LAB AP CASE REPORT: NORMAL
LAB AP GYN ADDITIONAL INFORMATION: NORMAL
LAB AP GYN OTHER FINDINGS: NORMAL
Lab: NORMAL
N GONORRHOEA RRNA SPEC QL NAA+PROBE: NOT DETECTED
PATH INTERP SPEC-IMP: NORMAL
STAT OF ADQ CVX/VAG CYTO-IMP: NORMAL
TRICHOMONAS VAGINALIS PCR: NOT DETECTED

## 2025-03-06 LAB
HAV IGM SERPL QL IA: NEGATIVE
HBV CORE IGM SERPL QL IA: NEGATIVE
HBV SURFACE AG SERPL QL IA: NEGATIVE
HCV AB SERPL QL IA: NORMAL
HCV IGG SERPL QL IA: NON REACTIVE
HIV 1+2 AB+HIV1 P24 AG SERPL QL IA: NON REACTIVE
RPR SER QL: NON REACTIVE

## 2025-03-10 ENCOUNTER — TELEPHONE (OUTPATIENT)
Dept: OBSTETRICS AND GYNECOLOGY | Age: 20
End: 2025-03-10
Payer: COMMERCIAL

## 2025-03-10 NOTE — TELEPHONE ENCOUNTER
Patient called to go over lab results. No answer , Left message to call office to go over results.    CARMEN Richards

## 2025-03-31 ENCOUNTER — TELEPHONE (OUTPATIENT)
Dept: OBSTETRICS AND GYNECOLOGY | Age: 20
End: 2025-03-31
Payer: COMMERCIAL

## 2025-03-31 RX ORDER — CLINDAMYCIN PHOSPHATE 10 MG/G
1 GEL TOPICAL EVERY 12 HOURS SCHEDULED
Qty: 60 G | Refills: 0 | Status: SHIPPED | OUTPATIENT
Start: 2025-03-31

## 2025-03-31 NOTE — TELEPHONE ENCOUNTER
Pt calling to report she was prescribed the clindamycin gel on 3/4 and has not been able to receive this. Spoke with [facility] pharmacy and they had informed they would need PA through pt insurance. Pt informed we would get this started for her. Voices understanding.

## 2025-04-01 NOTE — TELEPHONE ENCOUNTER
Pt contacted to verify new medication sent to pharmacy. Pt reports she received phone call from pharmacy this AM and was notified new clindamycin gel would be covered and was available for her. Pt advised to call office if having any other difficulties receiving medication.

## 2025-05-27 DIAGNOSIS — T75.3XXA MOTION SICKNESS, INITIAL ENCOUNTER: Primary | ICD-10-CM

## 2025-05-27 RX ORDER — SCOPOLAMINE 1 MG/3D
1 PATCH, EXTENDED RELEASE TRANSDERMAL
Qty: 5 EACH | Refills: 0 | Status: SHIPPED | OUTPATIENT
Start: 2025-05-27

## 2025-06-23 ENCOUNTER — OFFICE VISIT (OUTPATIENT)
Age: 20
End: 2025-06-23
Payer: COMMERCIAL

## 2025-06-23 VITALS
DIASTOLIC BLOOD PRESSURE: 62 MMHG | WEIGHT: 147 LBS | SYSTOLIC BLOOD PRESSURE: 130 MMHG | BODY MASS INDEX: 21.77 KG/M2 | HEIGHT: 69 IN

## 2025-06-23 DIAGNOSIS — Z30.41 ENCOUNTER FOR SURVEILLANCE OF CONTRACEPTIVE PILLS: Primary | ICD-10-CM

## 2025-06-23 DIAGNOSIS — L70.9 ACNE, UNSPECIFIED ACNE TYPE: ICD-10-CM

## 2025-06-23 RX ORDER — CLINDAMYCIN PHOSPHATE AND BENZOYL PEROXIDE 37.5; 1 MG/G; MG/G
1 GEL TOPICAL DAILY
Qty: 50 G | Refills: 0 | Status: SHIPPED | OUTPATIENT
Start: 2025-06-23

## 2025-06-23 RX ORDER — NORGESTIMATE AND ETHINYL ESTRADIOL 0.25-0.035
1 KIT ORAL DAILY
Qty: 84 TABLET | Refills: 1 | Status: SHIPPED | OUTPATIENT
Start: 2025-06-23

## 2025-06-23 NOTE — PROGRESS NOTES
"Chief Complaint  Med Management (Pt is here for a three month medication check on Microgestin FE after previously being on Nilsa.  Pt states that she likes it so far but does not feel it has helped with acne control as was the original reason for switching. )  History of Present Illness  The patient is a 19-year-old female who presents for acne.    She reports no improvement in her acne condition, which she experiences daily. The severity of her acne tends to escalate during her menstrual cycle. She has been using clindamycin gel as part of her treatment regimen but has not found it beneficial. Her insurance did not cover the original medication, necessitating a switch to an alternative. She was on a birth control pill in 2021 and 2022 that had a different color every week and she had acne with that too.    Subjective          Jared Bishop presents to [facility]  History of Present Illness    Review of Systems   Skin:         Continued acne           Objective   Vital Signs:   /62   Ht 175.3 cm (69\")   Wt 66.7 kg (147 lb)   BMI 21.71 kg/m²     Physical Exam  Vitals reviewed.   Constitutional:       Appearance: She is well-developed.   Eyes:      General:         Right eye: No discharge.         Left eye: No discharge.   Cardiovascular:      Rate and Rhythm: Normal rate and regular rhythm.   Pulmonary:      Effort: Pulmonary effort is normal.      Breath sounds: Normal breath sounds.   Skin:     General: Skin is warm.   Neurological:      Mental Status: She is alert and oriented to person, place, and time.   Psychiatric:         Behavior: Behavior normal.         Thought Content: Thought content normal.         Judgment: Judgment normal.       Physical Exam      Result Review :   The following data was reviewed by: MADINA Stevens on 06/23/2025:  OBGYN Diagnostics Review:   Lab Results   Component Value Date    CASEREPORT  03/04/2025     Gynecologic Cytology Report       "                 Case: [id number]                                  Authorizing Provider:  Roxanne Aleman APRN Collected:           03/04/2025 01:16 PM          Ordering Location:     [facility]     Received:            03/05/2025 05:45 AM                                 [facility]                                                                  First Screen:          Mandie Tejeda                                                              Specimen:    Liquid-Based Pap, Screening, Cervix                                                        INTERPGYN Negative for intraepithelial lesion or malignancy 03/04/2025    GENCAT Within normal limits 03/04/2025    SPECADGYN  03/04/2025     Satisfactory for evaluation, endocervical/transformation zone component present    ADDINFO  03/04/2025     Disclaimer: Cervical cytology is a screening test primarily for squamous cancer and its precursors and has associated false-negative and false-positive results.  Technologies such as liquid-based preparations may decrease but will not eliminate all false-negative results.  Follow-up of unexplained clinical signs and symptoms is recommended to minimize false-negative results. (The [place] System for Reporting Cervical Cytology: Patel, 2015).        Chlamydia by PCR   Date Value Ref Range Status   03/04/2025 Not Detected Not Detected Final     Neisseria gonorrhoeae by PCR   Date Value Ref Range Status   03/04/2025 Not Detected Not Detected Final     Trichomonas vaginalis PCR   Date Value Ref Range Status   03/04/2025 Not Detected Not Detected Final           Current Outpatient Medications on File Prior to Visit   Medication Sig    hydrOXYzine (ATARAX) 25 MG tablet Take 1 tablet by mouth 3 (Three) Times a Day As Needed for Itching.    ibuprofen (ADVIL,MOTRIN) 200 MG tablet Take 1 tablet by mouth Every 6 (Six) Hours As Needed for Mild Pain .    Scopolamine 1 MG/3DAYS patch Place 1 patch on the skin as directed by  provider Every 72 (Seventy-Two) Hours. (Patient not taking: Reported on 6/23/2025)     No current facility-administered medications on file prior to visit.     Assessment & Plan  Acne.    - Persistent acne despite changing birth control pills; worsens during menstruation but present daily.  - Complete current pill pack and start new prescription with consistent hormone levels throughout the month.  - Discontinue clindamycin gel due to ineffectiveness.  - Consider IUD and spironolactone if acne does not improve with new birth control pill; spironolactone can reduce hormonal acne by lowering testosterone levels.  - Attempt prior authorization for alternative medication; recommend use every other day initially due to potential drying effects and sun sensitivity.    Follow-up  Follow up in 3 months.    Diagnoses and all orders for this visit:    1. Encounter for surveillance of contraceptive pills (Primary)  -     norgestimate-ethinyl estradiol (Sprintec 28) 0.25-35 MG-MCG per tablet; Take 1 tablet by mouth Daily.  Dispense: 84 tablet; Refill: 1    2. Acne, unspecified acne type  -     norgestimate-ethinyl estradiol (Sprintec 28) 0.25-35 MG-MCG per tablet; Take 1 tablet by mouth Daily.  Dispense: 84 tablet; Refill: 1  -     Clindamycin Phos-Benzoyl Perox 1.2-3.75 % gel; Apply 1 Application topically Daily.  Dispense: 50 g; Refill: 0          BMI is within normal parameters. No other follow-up for BMI required.       Follow Up   Return in about 3 months (around 9/23/2025).    Patient was given instructions and counseling regarding her condition or for health maintenance advice. Please see specific information pulled into the AVS if appropriate.       Patient or patient representative verbalized consent for the use of Ambient Listening during the visit with  MADINA Stevens for chart documentation. 7/4/2025  23:14 CDT

## 2025-06-26 ENCOUNTER — TELEPHONE (OUTPATIENT)
Dept: OBSTETRICS AND GYNECOLOGY | Age: 20
End: 2025-06-26
Payer: COMMERCIAL

## 2025-06-26 DIAGNOSIS — L70.9 ACNE, UNSPECIFIED ACNE TYPE: Primary | ICD-10-CM

## 2025-06-26 NOTE — TELEPHONE ENCOUNTER
Received phone call from [facility] regarding pt prescription of clindamycin phos- benzoyl perox gel. They report 1.2-3.75% patch is not covered in pt insurance plan however the 1.2-2% and 1.2-5% gels will be covered. Okay to change percentages? Please advise and I can inform pharmacy.

## 2025-06-27 RX ORDER — CLINDAMYCIN PHOSPHATE AND BENZOYL PEROXIDE 25; 10 MG/G; MG/G
1 GEL TOPICAL DAILY
Qty: 50 G | Refills: 0 | Status: SHIPPED | OUTPATIENT
Start: 2025-06-27

## 2025-07-05 NOTE — PATIENT INSTRUCTIONS

## 2025-07-29 ENCOUNTER — OFFICE VISIT (OUTPATIENT)
Dept: FAMILY MEDICINE CLINIC | Facility: CLINIC | Age: 20
End: 2025-07-29
Payer: COMMERCIAL

## 2025-07-29 VITALS
HEART RATE: 88 BPM | SYSTOLIC BLOOD PRESSURE: 115 MMHG | OXYGEN SATURATION: 98 % | RESPIRATION RATE: 18 BRPM | WEIGHT: 150 LBS | BODY MASS INDEX: 22.22 KG/M2 | DIASTOLIC BLOOD PRESSURE: 75 MMHG | TEMPERATURE: 98.6 F | HEIGHT: 69 IN

## 2025-07-29 DIAGNOSIS — F41.9 ANXIETY: ICD-10-CM

## 2025-07-29 DIAGNOSIS — Z00.01 ENCOUNTER FOR WELL ADULT EXAM WITH ABNORMAL FINDINGS: Primary | ICD-10-CM

## 2025-07-29 DIAGNOSIS — Z23 IMMUNIZATION DUE: ICD-10-CM

## 2025-07-29 PROCEDURE — 90715 TDAP VACCINE 7 YRS/> IM: CPT | Performed by: NURSE PRACTITIONER

## 2025-07-29 PROCEDURE — 90471 IMMUNIZATION ADMIN: CPT | Performed by: NURSE PRACTITIONER

## 2025-07-29 PROCEDURE — 99395 PREV VISIT EST AGE 18-39: CPT | Performed by: NURSE PRACTITIONER

## 2025-07-29 RX ORDER — BREXPIPRAZOLE 0.25 MG/1
TABLET ORAL
Qty: 60 TABLET | Refills: 0 | Status: SHIPPED | OUTPATIENT
Start: 2025-07-29 | End: 2025-08-26

## 2025-07-29 NOTE — LETTER
[facility]  Vaccine Consent Form    Patient Name:  Jared Bishop  Patient :  2005     Vaccine(s) Ordered    Tdap Vaccine Greater Than or Equal To 8yo IM        Screening Checklist  The following questions should be completed prior to vaccination. If you answer “yes” to any question, it does not necessarily mean you should not be vaccinated. It just means we may need to clarify or ask more questions. If a question is unclear, please ask your healthcare provider to explain it.    Yes No   Any fever or moderate to severe illness today (mild illness and/or antibiotic treatment are not contraindications)?     Do you have a history of a serious reaction to any previous vaccinations, such as anaphylaxis, encephalopathy within 7 days, Guillain-Snelling syndrome within 6 weeks, seizure?     Have you received any live vaccine(s) (e.g MMR, BERTO) or any other vaccines in the last month (to ensure duplicate doses aren't given)?     Do you have an anaphylactic allergy to latex (DTaP, DTaP-IPV, Hep A, Hep B, MenB, RV, Td, Tdap), baker’s yeast (Hep B, HPV), polysorbates (RSV, nirsevimab, PCV 20 and 21, Rotavirrus, Tdap, Shingrix), or gelatin (BERTO, MMR)?     Do you have an anaphylactic allergy to neomycin (Rabies, BERTO, MMR, IPV, Hep A), polymyxin B (IPV), or streptomycin (IPV)?      Any cancer, leukemia, AIDS, or other immune system disorder? (BERTO, MMR, RV)     Do you have a parent, brother, or sister with an immune system problem (if immune competence of vaccine recipient clinically verified, can proceed)? (MMR, BERTO)     Any recent steroid treatments for >2 weeks, chemotherapy, or radiation treatment? (BERTO, MMR)     Have you received antibody-containing blood transfusions or IVIG in the past 11 months (recommended interval is dependent on product)? (MMR, BERTO)     Have you taken antiviral drugs (acyclovir, famciclovir, valacyclovir for BERTO) in the last 24 or 48 hours, respectively?      Are you pregnant or planning to  "become pregnant within 1 month? (BERTO, MMR, HPV, IPV, MenB, Abrexvy; For Hep B- refer to Engerix-B; For RSV - Abrysvo is indicated for 32-36 weeks of pregnancy from September to January)     For infants, have you ever been told your child has had intussusception or a medical emergency involving obstruction of the intestine (Rotavirus)? If not for an infant, can skip this question.         *Ordering Physicians/APC should be consulted if \"yes\" is checked by the patient or guardian above.  I have received, read, and understand the Vaccine Information Statement (VIS) for each vaccine ordered.  I have considered my or my child's health status as well as the health status of my close contacts.  I have taken the opportunity to discuss my vaccine questions with my or my child's health care provider.   I have requested that the ordered vaccine(s) be given to me or my child.  I understand the benefits and risks of the vaccines.  I understand that I should remain in the clinic for 15 minutes after receiving the vaccine(s).  _________________________________________________________  Signature of Patient or Parent/Legal Guardian ____________________  Date     "

## 2025-07-29 NOTE — PROGRESS NOTES
Subjective   Well adult    Jared Bishop is a 19 y.o. patient who presents for an annual well visit    The following portions of the patient's history were reviewed and updated as appropriate: allergies, current medications, past family history, past medical history, past social history, past surgical history, and problem list.    Compared to one year ago, the patient's physical health is the same.  Compared to one year ago, the patient's mental health is the same.    Recent Hospitalizations:  She was not admitted to the hospital during the last year.     Current Medical Providers:  Patient Care Team:  Oralia Marie MD as PCP - General  BrooklynnMecca mohamud DNP, APRN as PCP - Family Medicine    Outpatient Medications Prior to Visit   Medication Sig Dispense Refill    hydrOXYzine (ATARAX) 25 MG tablet Take 1 tablet by mouth 3 (Three) Times a Day As Needed for Itching. 90 tablet 1    ibuprofen (ADVIL,MOTRIN) 200 MG tablet Take 1 tablet by mouth Every 6 (Six) Hours As Needed for Mild Pain . 20 tablet 0    norgestimate-ethinyl estradiol (Sprintec 28) 0.25-35 MG-MCG per tablet Take 1 tablet by mouth Daily. 84 tablet 1    Scopolamine 1 MG/3DAYS patch Place 1 patch on the skin as directed by provider Every 72 (Seventy-Two) Hours. (Patient not taking: Reported on 7/29/2025) 5 each 0    Clindamycin Phos-Benzoyl Perox 1.2-2.5 % gel Apply topically to the appropriate area as directed Daily. 50 g 0    Clindamycin Phos-Benzoyl Perox 1.2-3.75 % gel Apply 1 Application topically Daily. 50 g 0     No facility-administered medications prior to visit.     No opioid medication identified on active medication list. I have reviewed chart for other potential  high risk medication/s and harmful drug interactions in the elderly.      Aspirin is not on active medication list.  Aspirin use is not indicated based on review of current medical condition/s. Risk of harm outweighs potential benefits.  .    Advance Care Planning Advance  "Directive is not on file.  ACP discussion was held with the patient during this visit. Patient does not have an advance directive, declines further assistance.      Objective   Vitals:    25 0944   BP: 115/75   BP Location: Left arm   Patient Position: Sitting   Cuff Size: Large Adult   Pulse: 88   Resp: 18   Temp: 98.6 °F (37 °C)   TempSrc: Infrared   SpO2: 98%   Weight: 68 kg (150 lb)   Height: 175.3 cm (69\")   PainSc: 0-No pain     Estimated body mass index is 22.15 kg/m² as calculated from the following:    Height as of this encounter: 175.3 cm (69\").    Weight as of this encounter: 68 kg (150 lb).    BMI is within normal parameters. No other follow-up for BMI required.    Does the patient have evidence of cognitive impairment? No                                                                                        Health  Risk Assessment    Smoking Status:  Social History     Tobacco Use   Smoking Status Never   Smokeless Tobacco Never     Alcohol Consumption:  Social History     Substance and Sexual Activity   Alcohol Use No       Fall Risk Screen  STEADI Fall Risk Assessment was completed, and patient is at LOW risk for falls.Assessment completed on:2025    Depression Screening   Little interest or pleasure in doing things? Not at all   Feeling down, depressed, or hopeless? Not at all   PHQ-2 Total Score 0      Health Habits and Functional and Cognitive Screenin/29/2025    10:00 AM   Functional & Cognitive Status   Do you have difficulty preparing food and eating? No   Do you have difficulty bathing yourself, getting dressed or grooming yourself? No   Do you have difficulty using the toilet? No   Do you have difficulty moving around from place to place? No   Do you have trouble with steps or getting out of a bed or a chair? No   Current Diet Well Balanced Diet   Dental Exam Up to date   Eye Exam Up to date   Exercise (times per week) 3 times per week   Current Exercises Include Walking "   Do you need help using the phone?  No   Are you deaf or do you have serious difficulty hearing?  No   Do you need help to go to places out of walking distance? No   Do you need help shopping? No   Do you need help preparing meals?  No   Do you need help with housework?  No   Do you need help with laundry? No   Do you need help taking your medications? No   Do you need help managing money? No   Do you ever drive or ride in a car without wearing a seat belt? No   Have you felt unusual fatigue (could be tiredness), stress, anger or loneliness in the last month? No   Who do you live with? Other   If you need help, do you have trouble finding someone available to you? No   Have you been bothered in the last four weeks by sexual problems? No   Do you have difficulty concentrating, remembering or making decisions? No           Age-appropriate Screening Schedule:  Refer to the list below for future screening recommendations based on patient's age, sex and/or medical conditions. Orders for these recommended tests are listed in the plan section. The patient has been provided with a written plan.    Health Maintenance List  Health Maintenance   Topic Date Due    TDAP/TD VACCINES (2 - Td or Tdap) 05/20/2025    HPV VACCINES (1 - 3-dose series) 01/25/2026 (Originally 11/24/2018)    COVID-19 Vaccine (3 - 2024-25 season) 01/29/2026 (Originally 9/1/2024)    INFLUENZA VACCINE  10/01/2025    CHLAMYDIA SCREENING  03/04/2026    ANNUAL PHYSICAL  07/29/2026    PAP SMEAR  03/04/2028    HEPATITIS C SCREENING  Completed    MENINGOCOCCAL VACCINE  Completed    Pneumococcal Vaccine 0-49  Aged Out    MENINGOCOCCAL B VACCINE  Discontinued       Physical Exam  Vitals and nursing note reviewed.   Constitutional:       General: She is awake.      Appearance: Normal appearance. She is well-developed and well-groomed.   HENT:      Head: Normocephalic and atraumatic.      Right Ear: Hearing, tympanic membrane, ear canal and external ear normal.       Left Ear: Hearing, tympanic membrane, ear canal and external ear normal.      Nose: Nose normal.      Mouth/Throat:      Lips: Pink.      Pharynx: Oropharynx is clear.   Eyes:      General: Lids are normal.      Conjunctiva/sclera: Conjunctivae normal.   Cardiovascular:      Rate and Rhythm: Normal rate and regular rhythm.      Heart sounds: Normal heart sounds.   Pulmonary:      Effort: Pulmonary effort is normal.      Breath sounds: Normal breath sounds and air entry.   Musculoskeletal:      Cervical back: Full passive range of motion without pain.      Right lower leg: No edema.      Left lower leg: No edema.   Lymphadenopathy:      Head:      Right side of head: No submental, submandibular or tonsillar adenopathy.      Left side of head: No submental, submandibular or tonsillar adenopathy.   Skin:     General: Skin is warm and dry.   Neurological:      Mental Status: She is alert.      Sensory: Sensation is intact.      Motor: Motor function is intact.      Coordination: Coordination is intact.      Gait: Gait is intact.   Psychiatric:         Attention and Perception: Attention and perception normal.         Mood and Affect: Mood and affect normal.         Speech: Speech normal.         Behavior: Behavior normal. Behavior is cooperative.         Thought Content: Thought content normal.         Cognition and Memory: Cognition and memory normal.         Judgment: Judgment normal.                                                                                                                                                She also has anxiety and takes hydroxyzine however it makes her very sleepy and she can't take the medication.  Does not have any panic attacks, but the anxiety is getting worse.  No suicidal/homicidal ideations     Assessment & Plan  Encounter for well adult exam with abnormal findings         Immunization due    Orders:    Tdap Vaccine Greater Than or Equal To 8yo IM    Anxiety    Orders:     Brexpiprazole (Rexulti) 0.25 MG tablet; Take 0.25 mg by mouth Daily for 14 days, THEN 0.5 mg Daily for 14 days.    PHARMACOGENOMICS PROFILE, ACT[facility] - Swab,; Future         Follow Up:   Return in about 1 month (around 8/29/2025) for Recheck.    Electronically signed by Mecca Overton DNP, MADINA, 07/29/25, 10:33 AM CDT.

## 2025-08-05 ENCOUNTER — TELEPHONE (OUTPATIENT)
Dept: FAMILY MEDICINE CLINIC | Facility: CLINIC | Age: 20
End: 2025-08-05
Payer: COMMERCIAL

## 2025-08-29 ENCOUNTER — OFFICE VISIT (OUTPATIENT)
Dept: FAMILY MEDICINE CLINIC | Facility: CLINIC | Age: 20
End: 2025-08-29
Payer: COMMERCIAL

## 2025-08-29 VITALS
DIASTOLIC BLOOD PRESSURE: 83 MMHG | HEART RATE: 84 BPM | TEMPERATURE: 98.2 F | BODY MASS INDEX: 22.36 KG/M2 | OXYGEN SATURATION: 99 % | SYSTOLIC BLOOD PRESSURE: 119 MMHG | RESPIRATION RATE: 18 BRPM | HEIGHT: 69 IN | WEIGHT: 151 LBS

## 2025-08-29 DIAGNOSIS — F41.9 ANXIETY: Primary | ICD-10-CM

## 2025-08-29 PROCEDURE — 99213 OFFICE O/P EST LOW 20 MIN: CPT | Performed by: NURSE PRACTITIONER
